# Patient Record
Sex: MALE | Race: WHITE | Employment: FULL TIME | URBAN - NONMETROPOLITAN AREA
[De-identification: names, ages, dates, MRNs, and addresses within clinical notes are randomized per-mention and may not be internally consistent; named-entity substitution may affect disease eponyms.]

---

## 2017-01-06 ENCOUNTER — OFFICE VISIT (OUTPATIENT)
Dept: CARDIOLOGY | Age: 62
End: 2017-01-06

## 2017-01-06 VITALS
SYSTOLIC BLOOD PRESSURE: 124 MMHG | BODY MASS INDEX: 28.91 KG/M2 | HEART RATE: 80 BPM | WEIGHT: 213.4 LBS | DIASTOLIC BLOOD PRESSURE: 72 MMHG | HEIGHT: 72 IN

## 2017-01-06 DIAGNOSIS — R00.2 PALPITATION: Primary | ICD-10-CM

## 2017-01-06 PROCEDURE — 99213 OFFICE O/P EST LOW 20 MIN: CPT | Performed by: NUCLEAR MEDICINE

## 2019-01-15 ENCOUNTER — OFFICE VISIT (OUTPATIENT)
Dept: FAMILY MEDICINE CLINIC | Age: 64
End: 2019-01-15
Payer: COMMERCIAL

## 2019-01-15 VITALS
OXYGEN SATURATION: 96 % | DIASTOLIC BLOOD PRESSURE: 76 MMHG | BODY MASS INDEX: 29.53 KG/M2 | SYSTOLIC BLOOD PRESSURE: 138 MMHG | RESPIRATION RATE: 16 BRPM | HEIGHT: 72 IN | WEIGHT: 218 LBS | HEART RATE: 56 BPM

## 2019-01-15 DIAGNOSIS — Z00.00 ANNUAL PHYSICAL EXAM: Primary | ICD-10-CM

## 2019-01-15 DIAGNOSIS — Z11.59 NEED FOR HEPATITIS C SCREENING TEST: ICD-10-CM

## 2019-01-15 DIAGNOSIS — D75.1 POLYCYTHEMIA: ICD-10-CM

## 2019-01-15 DIAGNOSIS — M05.79 RHEUMATOID ARTHRITIS INVOLVING MULTIPLE SITES WITH POSITIVE RHEUMATOID FACTOR (HCC): Chronic | ICD-10-CM

## 2019-01-15 DIAGNOSIS — N39.43 URINARY DRIBBLING: ICD-10-CM

## 2019-01-15 PROCEDURE — 99386 PREV VISIT NEW AGE 40-64: CPT | Performed by: FAMILY MEDICINE

## 2019-01-15 RX ORDER — ASPIRIN 325 MG
325 TABLET ORAL DAILY
COMMUNITY

## 2019-01-15 ASSESSMENT — ENCOUNTER SYMPTOMS
WHEEZING: 0
CONSTIPATION: 0
EYE DISCHARGE: 0
DIARRHEA: 0
SHORTNESS OF BREATH: 0
RHINORRHEA: 0

## 2019-01-15 ASSESSMENT — PATIENT HEALTH QUESTIONNAIRE - PHQ9
2. FEELING DOWN, DEPRESSED OR HOPELESS: 0
SUM OF ALL RESPONSES TO PHQ9 QUESTIONS 1 & 2: 0
SUM OF ALL RESPONSES TO PHQ QUESTIONS 1-9: 0
1. LITTLE INTEREST OR PLEASURE IN DOING THINGS: 0
SUM OF ALL RESPONSES TO PHQ QUESTIONS 1-9: 0

## 2019-01-23 LAB
ANION GAP SERPL CALCULATED.3IONS-SCNC: 13 MEQ/L (ref 10–19)
BUN BLDV-MCNC: 14 MG/DL (ref 8–23)
CALCIUM SERPL-MCNC: 9.2 MG/DL (ref 8.5–10.5)
CHLORIDE BLD-SCNC: 106 MEQ/L (ref 95–107)
CO2: 24 MEQ/L (ref 19–31)
CREAT SERPL-MCNC: 0.8 MG/DL (ref 0.8–1.4)
EGFR AFRICAN AMERICAN: 110.2 ML/MIN/1.73 M2
EGFR IF NONAFRICAN AMERICAN: 95.1 ML/MIN/1.73 M2
GLUCOSE: 102 MG/DL (ref 70–99)
HEPATITIS C ANTIBODY: NEGATIVE
POTASSIUM SERPL-SCNC: 4.5 MEQ/L (ref 3.5–5.4)
PSA, ULTRASENSITIVE: 1.2 NG/ML
SODIUM BLD-SCNC: 143 MEQ/L (ref 135–146)

## 2019-10-12 LAB — GLUCOSE FASTING: 101 MG/DL

## 2020-09-04 ENCOUNTER — HOSPITAL ENCOUNTER (OUTPATIENT)
Dept: INFUSION THERAPY | Age: 65
Discharge: HOME | End: 2020-09-04
Payer: COMMERCIAL

## 2020-09-04 VITALS
TEMPERATURE: 97.8 F | HEART RATE: 82 BPM | RESPIRATION RATE: 18 BRPM | SYSTOLIC BLOOD PRESSURE: 149 MMHG | WEIGHT: 218.4 LBS | OXYGEN SATURATION: 96 % | DIASTOLIC BLOOD PRESSURE: 88 MMHG | HEIGHT: 72 IN | BODY MASS INDEX: 29.58 KG/M2

## 2020-09-04 DIAGNOSIS — M05.79 RHEUMATOID ARTHRITIS INVOLVING MULTIPLE SITES WITH POSITIVE RHEUMATOID FACTOR (HCC): Primary | ICD-10-CM

## 2020-09-04 DIAGNOSIS — D75.1 POLYCYTHEMIA: ICD-10-CM

## 2020-09-04 PROCEDURE — 99195 PHLEBOTOMY: CPT

## 2020-09-04 RX ORDER — 0.9 % SODIUM CHLORIDE 0.9 %
250 INTRAVENOUS SOLUTION INTRAVENOUS ONCE
Status: CANCELLED | OUTPATIENT
Start: 2020-09-04

## 2020-09-04 RX ORDER — 0.9 % SODIUM CHLORIDE 0.9 %
500 INTRAVENOUS SOLUTION INTRAVENOUS ONCE
Status: CANCELLED | OUTPATIENT
Start: 2020-09-04

## 2020-09-04 NOTE — PROGRESS NOTES
THERAPEUTIC PHLEBOTOMY    Most recent Hemoglobin result: 17.3Gm/dl. Hematocrit result  50.6%  Date obtained:   09 /03 /2020    Pre-phlebotomy vital signs:see Doc flow sheet    Start time:1421    Empty donor bag placed on TARE scale. Tourniquet placed on patient's arm and arm palpated for venous access. Area of venous stick cleansed with alcohol. Hemostat applied to tubing of donor bag. Sheath removed from the needle and needle inserted into the antecubital Vein in the  left Arm. Hemostat released. Blood flowing well down the tubing into the bag. No adjustment of needle needed to maintain adequate blood flow. Scale monitoring amount of blood in bag. Stop Time:1427  Needle removed from patient's arm. Pressure applied to patient's arm until bleeding stopped. Dry sterile dressing applied over puncture site and taped down well and secured with coban wrap. Final amount of blood removed:400ml  Post Vital Signs:see Doc flow sheet  Patient drank trin[place] mist and observed for 20 minutes. Patient tolerated procedure well. Discharged ambulatory with belongings.

## 2020-09-04 NOTE — PLAN OF CARE
Problem: Musculor/Skeletal Functional Status  Goal: Absence of falls  Outcome: Met This Shift  Note: No falls occurred with visit today. Intervention: Fall precautions  Note: Verbalized understanding of fall prevention to ask for assistance with ambulation. Call light within reach. Problem: Intellectual/Education/Knowledge Deficit  Goal: Teaching initiated upon admission  Outcome: Met This Shift  Note: Patient verbalized understanding to therapeutic phlebotomy procedure and possible complications. Intervention: Verbal/written education provided  Note: Patient instructed on therapeutic phlebotomy procedure and potential complications. Consent signed. Aware to call MD if complications occur. Problem: Discharge Planning  Goal: Knowledge of discharge instructions  Description: Knowledge of discharge instructions  Outcome: Met This Shift  Note: Patient understands home discharge instructions sheet. Intervention: Interaction with patient/family and care team  Note: Patient instructed on therapeutic phlebotomy procedure and potential complications. Consent signed. Aware to call MD if complications occur. Care plan reviewed with patient . Patient  verbalize understanding of the plan of care and contribute to goal setting.